# Patient Record
Sex: MALE | Race: WHITE
[De-identification: names, ages, dates, MRNs, and addresses within clinical notes are randomized per-mention and may not be internally consistent; named-entity substitution may affect disease eponyms.]

---

## 2022-08-09 ENCOUNTER — HOSPITAL ENCOUNTER (EMERGENCY)
Dept: HOSPITAL 12 - ER | Age: 67
Discharge: HOME | End: 2022-08-09
Payer: MEDICAID

## 2022-08-09 VITALS — BODY MASS INDEX: 24.91 KG/M2 | HEIGHT: 70 IN | WEIGHT: 174 LBS

## 2022-08-09 DIAGNOSIS — E11.65: ICD-10-CM

## 2022-08-09 DIAGNOSIS — R10.11: Primary | ICD-10-CM

## 2022-08-09 DIAGNOSIS — I10: ICD-10-CM

## 2022-08-09 DIAGNOSIS — K65.4: ICD-10-CM

## 2022-08-09 DIAGNOSIS — Z79.84: ICD-10-CM

## 2022-08-09 DIAGNOSIS — N40.0: ICD-10-CM

## 2022-08-09 LAB
ALP SERPL-CCNC: 52 U/L (ref 50–136)
ALT SERPL W/O P-5'-P-CCNC: 33 U/L (ref 16–63)
APPEARANCE UR: CLEAR
AST SERPL-CCNC: 12 U/L (ref 15–37)
BILIRUB DIRECT SERPL-MCNC: 0.1 MG/DL (ref 0–0.2)
BILIRUB SERPL-MCNC: 0.6 MG/DL (ref 0.2–1)
BILIRUB UR QL STRIP: NEGATIVE
BUN SERPL-MCNC: 14 MG/DL (ref 7–18)
CHLORIDE SERPL-SCNC: 102 MMOL/L (ref 98–107)
CO2 SERPL-SCNC: 28 MMOL/L (ref 21–32)
COLOR UR: YELLOW
CREAT SERPL-MCNC: 1 MG/DL (ref 0.6–1.3)
DEPRECATED SQUAMOUS URNS QL MICRO: (no result) /HPF
GLUCOSE SERPL-MCNC: 174 MG/DL (ref 74–106)
GLUCOSE UR STRIP-MCNC: (no result) MG/DL
HCT VFR BLD AUTO: 43 % (ref 36.7–47.1)
HGB UR QL STRIP: NEGATIVE
KETONES UR STRIP-MCNC: NEGATIVE MG/DL
LEUKOCYTE ESTERASE UR QL STRIP: NEGATIVE
LIPASE SERPL-CCNC: 133 U/L (ref 73–393)
MCH RBC QN AUTO: 31.2 UUG (ref 23.8–33.4)
MCV RBC AUTO: 89.2 FL (ref 73–96.2)
NITRITE UR QL STRIP: NEGATIVE
PH UR STRIP: 5.5 [PH] (ref 5–8)
PLATELET # BLD AUTO: 289 K/UL (ref 152–348)
POTASSIUM SERPL-SCNC: 4.3 MMOL/L (ref 3.5–5.1)
RBC #/AREA URNS HPF: (no result) /HPF (ref 0–3)
SP GR UR STRIP: 1.02 (ref 1–1.03)
UROBILINOGEN UR STRIP-MCNC: 0.2 E.U./DL
WBC #/AREA URNS HPF: (no result) /HPF
WBC #/AREA URNS HPF: (no result) /HPF (ref 0–3)
WS STN SPEC: 9 G/DL (ref 6.4–8.2)

## 2022-08-09 PROCEDURE — 99285 EMERGENCY DEPT VISIT HI MDM: CPT

## 2022-08-09 PROCEDURE — 81001 URINALYSIS AUTO W/SCOPE: CPT

## 2022-08-09 PROCEDURE — 74176 CT ABD & PELVIS W/O CONTRAST: CPT

## 2022-08-09 PROCEDURE — 80076 HEPATIC FUNCTION PANEL: CPT

## 2022-08-09 PROCEDURE — A4663 DIALYSIS BLOOD PRESSURE CUFF: HCPCS

## 2022-08-09 PROCEDURE — 76705 ECHO EXAM OF ABDOMEN: CPT

## 2022-08-09 PROCEDURE — 83690 ASSAY OF LIPASE: CPT

## 2022-08-09 PROCEDURE — 85025 COMPLETE CBC W/AUTO DIFF WBC: CPT

## 2022-08-09 PROCEDURE — 36415 COLL VENOUS BLD VENIPUNCTURE: CPT

## 2022-08-09 PROCEDURE — 80048 BASIC METABOLIC PNL TOTAL CA: CPT

## 2022-08-09 NOTE — NUR
patient refusing morphine and zofran at this time, would like to wait for 
results first before pain medications.